# Patient Record
(demographics unavailable — no encounter records)

---

## 2024-11-25 NOTE — END OF VISIT
[FreeTextEntry3] :     Saw and examined patient; the above is an accurate documentation of my words and actions.   Nany Patricio MD [facility] Pediatric Orthopedic Surgery    [Time Spent: ___ minutes] : I have spent [unfilled] minutes of time on the encounter which excludes teaching and separately reported services.

## 2025-04-09 NOTE — HISTORY OF PRESENT ILLNESS
[FreeTextEntry1] : ALEXANDER is a 13 year old girl with Pertinent PMH:  RESPIRATORY HISTORY - Symptoms when sick: - Symptoms when well: - Symptoms with activity: - Albuterol or ICS use: - ER visits - Admissions: - Oral steroid use:  - FMH of Asthma: - Eczema: - Allergies (food/environment): - Frequent AOM: - Snore frequently and loud: - Vaccinations: up-to-date. - Covid history & vaccination status: - Additional history (pets - smoke exposure):  ___________________________________________________________________________________ Asthma Control Test - Asthma symptoms in the last 4 weeks:  1. Rate your asthma today?                          3-very good, 2-good, 1-bad, 0-very bad.     Score: 2. Activity induced symptoms? 3-very good, 2-sometimes, 1-frequently, 0-all the time.    Score: 3. How is cough?      3-none of the time, 2-sometimes, 1 -most time, 0-all the time.           Score: 4. Nighttime awakening?          3-none, 2-sometimes, 1-most time, 0-all the time.               Score: 5. Symptoms presented 5) none, 4) 1 - 3 times, 3) 4 - 10 times, 2) 11 - 18 time, 1) 19 - 24 times, 0) everyday. ---Daytime stx?   Score: ---Wheezing?      Score: ---Wake up?        Score:  Total score: **  If </or 19, asthma may not be controlled as well as it could be.

## 2025-06-13 NOTE — REASON FOR VISIT
[Initial Evaluation] : an initial evaluation of [Asthma/RAD] : asthma/RAD [Mother] : mother [Other: _____] : [unfilled] [Shortness of Breath] : shortness of breath [Patient] : patient [Father] : father

## 2025-06-13 NOTE — PHYSICAL EXAM
[Well Nourished] : well nourished [Well Developed] : well developed [Alert] : ~L alert [Active] : active [Normal Breathing Pattern] : normal breathing pattern [No Respiratory Distress] : no respiratory distress [No Allergic Shiners] : no allergic shiners [No Drainage] : no drainage [No Conjunctivitis] : no conjunctivitis [Tympanic Membranes Clear] : tympanic membranes were clear [Nasal Mucosa Non-Edematous] : nasal mucosa non-edematous [No Nasal Drainage] : no nasal drainage [No Polyps] : no polyps [No Sinus Tenderness] : no sinus tenderness [No Oral Pallor] : no oral pallor [No Oral Cyanosis] : no oral cyanosis [Non-Erythematous] : non-erythematous [No Exudates] : no exudates [No Postnasal Drip] : no postnasal drip [No Tonsillar Enlargement] : no tonsillar enlargement [Absence Of Retractions] : absence of retractions [Symmetric] : symmetric [Good Expansion] : good expansion [No Acc Muscle Use] : no accessory muscle use [Good aeration to bases] : good aeration to bases [Equal Breath Sounds] : equal breath sounds bilaterally [No Crackles] : no crackles [No Rhonchi] : no rhonchi [No Wheezing] : no wheezing [Normal Sinus Rhythm] : normal sinus rhythm [No Heart Murmur] : no heart murmur [Soft, Non-Tender] : soft, non-tender [No Hepatosplenomegaly] : no hepatosplenomegaly [Non Distended] : was not ~L distended [Abdomen Mass (___ Cm)] : no abdominal mass palpated [Full ROM] : full range of motion [No Clubbing] : no clubbing [Capillary Refill < 2 secs] : capillary refill less than two seconds [No Cyanosis] : no cyanosis [No Petechiae] : no petechiae [No Kyphoscoliosis] : no kyphoscoliosis [No Contractures] : no contractures [Alert and  Oriented] : alert and oriented [Normal Muscle Tone And Reflexes] : normal muscle tone and reflexes [No Abnormal Focal Findings] : no abnormal focal findings [No Birth Marks] : no birth marks [No Rashes] : no rashes [No Skin Lesions] : no skin lesions [FreeTextEntry4] : +rhinorrhea [FreeTextEntry7] : +mild reduction of breath sounds throughout

## 2025-06-13 NOTE — REVIEW OF SYSTEMS
[NI] : Genitourinary  [Nl] : Endocrine [Rhinorrhea] : rhinorrhea [Nasal Congestion] : nasal congestion [Cough] : cough [Shortness of Breath] : shortness of breath

## 2025-06-13 NOTE — DATA REVIEWED
[FreeTextEntry1] : I personally reviewed records, documentation and images (findings included into my note), by and including: - Keyonna Pineda MD 06/25/2024. - Dr. Nany Patricio, from 11/25/2024.

## 2025-06-13 NOTE — HISTORY OF PRESENT ILLNESS
[FreeTextEntry1] : ALEXANDER is a 14 year old girl with recurring exertional shortness of breath. FH of asthma (Father). ALLERGIES: spring/seasonal allergies. No PRNs meds. CARDS: seen for migraines.  INITIAL VISIT 6/11/2025 Symptoms started about a year ago, with SOB (feels like she can't breathe and chest tightness). Symptoms usually start mid-way through basketball/soccer and resolve 5-30 mins after rest, occurs most of time. RSV 4yo required [albuterol?] nebulization, things improved after and never nebulized again. Currently with some "allergy symptoms" with runny nose. Never tested. Spoke with mother via phone for a short time; discussed suspected asthma diagnosis. SpO2 today was 97% likely due to nail polish.  RESPIRATORY HISTORY - Symptoms when sick: +sometimes lingering cough. - Symptoms when well: none. - Symptoms with activity: +SOB - Albuterol or ICS use: +nebulized at 4yo. - ER visits - Admissions: no. - Oral steroid use: possibly at 4yo with RSV. - FMH of Asthma: +Father had 'sports induced asthma' but outgrew. He also has eczema. - Eczema: no. - Allergies (food/environment): no. - Frequent AOM: no. - Snore frequently and loud: no. - Vaccinations: up-to-date. - Additional history (pets - smoke exposure): "jean doddle", no triggered allergy symptoms.  ____________________________________________________________________________________ Asthma Control Test (ACT) >12 year olds. In the last 4 weeks: -Shortness of breath: 5. none, 4. once to twice/week, 3. three to six/week, 2. once/day, 1. >once/day. Score: 2 -Nighttime stx: 5. none, 4. once to twice/MONTH, 3. once/week, 2. two to three/week, 1. > 4x/week. Score: 5 -Rescue inhaler: 5. none, 4. once/week, 3. two to three/week, 2. once to twice/day, 1. > 3x/day. Score: 5 -Rate asthma control: 5. controlled, 4. well controlled, 3. somewhat, 2. poorly, 1. uncontrolled. Score: 1 -Activity limitations: 5. none, 4. A little, 3. Some, 2. Most, 1. All the time. Score: 3 Total score: 16  If </or 19, asthma may not be controlled as well as it could be.

## 2025-06-13 NOTE — ASSESSMENT
[FreeTextEntry1] : ALEXANDER, 14 year old girl with history of recurring respiratory symptoms (shortness of breath with activity), which along with physical examination (reduced lung air exchange) and presence of asthma risk factors (Father had asthma) support asthma, likely sports-induced although persistent asthma may be possible (mild obstructive pattern on PFT). Recommend implementing an asthma rescue inhaler to help prevent symptoms. Discussed, that in asthma acute asthma attacks may lead to complications including life-threatening events; this is likely unlikely in this patient. Discussed asthma etiology, risk factors, triggers (e.g. URIs, allergens, etc), complications and management. Educated on proper MDI/spacer technique, importance of medication compliance and encouraged tobacco smoke exposure avoidance.  Data reviewed and interpreted by me today, including pulmonary function test (PFT)/spirometry, revealed abnormalities, including mild obstructive pattern (based on step-down of flows primarily) which is consistent with more of a persistent asthma. Will start with albuterol for now. Plan to perform a pre- and post-bronchodilator spirometry at her next visit.   Seasonal allergies are often present in asthma. Patient has history of rhinorrhea which could support Allergic Rhinitis (AR). The use of medical treatment (e.g. antihistamines) and avoidance measure may be discussed in future. Also, discussed benefits of allergy testing - referred to allergist for SPT.   Discussed above assessment, management plan and potential medication side effects. Parent agreed with plan. All queries were answered. Evaluation includes normal saturation. Time excludes separately reported services.   Recommend: - RESCUE AS NEEDED INHALER: Albuterol, 2 - 4 puffs every 4 - 6 hours, "as needed" for cough, shortness of breath or wheeze (rescue inhaler, use with spacer). - Use Albuterol 15-20 minutes before physical activities. - Proper technique of inhaled medication administration reviewed. Adequate technique confirmed. - Allergy referral. Dr. Chucky Thompson, Dr. Ulises Rosas or Dr. Zhao Whittington. Call [phone] or [phone] to schedule appointment. - Follow-up in 3 months. - Pre- and post-bronchodilator spirometry PFT at next visit.

## 2025-06-13 NOTE — ASSESSMENT
[FreeTextEntry1] : ALEXANDER, 14 year old girl with history of recurring respiratory symptoms (shortness of breath with activity), which along with physical examination (reduced lung air exchange) and presence of asthma risk factors (Father had asthma) support asthma, likely sports-induced although persistent asthma may be possible (mild obstructive pattern on PFT). Recommend implementing an asthma rescue inhaler to help prevent symptoms. Discussed, that in asthma acute asthma attacks may lead to complications including life-threatening events; this is likely unlikely in this patient. Discussed asthma etiology, risk factors, triggers (e.g. URIs, allergens, etc), complications and management. Educated on proper MDI/spacer technique, importance of medication compliance and encouraged tobacco smoke exposure avoidance.  Data reviewed and interpreted by me today, including pulmonary function test (PFT)/spirometry, revealed abnormalities, including mild obstructive pattern (based on step-down of flows primarily) which is consistent with more of a persistent asthma. Will start with albuterol for now. Plan to perform a pre- and post-bronchodilator spirometry at her next visit.   Seasonal allergies are often present in asthma. Patient has history of rhinorrhea which could support Allergic Rhinitis (AR). The use of medical treatment (e.g. antihistamines) and avoidance measure may be discussed in future. Also, discussed benefits of allergy testing - referred to allergist for SPT.   Discussed above assessment, management plan and potential medication side effects. Parent agreed with plan. All queries were answered. Evaluation includes normal saturation. Time excludes separately reported services.   Recommend: - RESCUE AS NEEDED INHALER: Albuterol, 2 - 4 puffs every 4 - 6 hours, "as needed" for cough, shortness of breath or wheeze (rescue inhaler, use with spacer). - Use Albuterol 15-20 minutes before physical activities. - Proper technique of inhaled medication administration reviewed. Adequate technique confirmed. - Allergy referral. Dr. Chucky Thompson, Dr. Ulises Rosas or Dr. Zhao Whittington. Call [phone] or [phone] to schedule appointment. - Follow-up in 3 months. - Pre- and post-bronchodilator spirometry PFT at next visit.

## 2025-06-13 NOTE — DATA REVIEWED
[FreeTextEntry1] : I personally reviewed records, documentation and images (findings included into my note), by and including: - Keyonna Pineda MD 06/25/2024. - Dr. Nany Patricio, from 11/25/2024. 2021 20:35

## 2025-06-13 NOTE — CONSULT LETTER
[Dear  ___] : Dear  [unfilled], [Consult Letter:] : I had the pleasure of evaluating your patient, [unfilled]. [Please see my note below.] : Please see my note below. [Consult Closing:] : Thank you very much for allowing me to participate in the care of this patient.  If you have any questions, please do not hesitate to contact me. [Sincerely,] : Sincerely, [FreeTextEntry3] : Steve Lewis MD Attending Physician, Division of Pediatric Pulmonology.

## 2025-06-13 NOTE — IMPRESSION
[FreeTextEntry1] : 6/11/2025 simple pft/spirometry: mild obstructive pattern based on step-down of flows, borderline low FEV1/FVC. FEV1 was 97% which was normal.

## 2025-06-13 NOTE — PHYSICAL EXAM
[Well Nourished] : well nourished [Well Developed] : well developed [Alert] : ~L alert [Active] : active [Normal Breathing Pattern] : normal breathing pattern [No Respiratory Distress] : no respiratory distress [No Allergic Shiners] : no allergic shiners [No Drainage] : no drainage [No Conjunctivitis] : no conjunctivitis [Tympanic Membranes Clear] : tympanic membranes were clear [Nasal Mucosa Non-Edematous] : nasal mucosa non-edematous [No Nasal Drainage] : no nasal drainage [No Polyps] : no polyps [No Sinus Tenderness] : no sinus tenderness [No Oral Pallor] : no oral pallor [No Oral Cyanosis] : no oral cyanosis [Non-Erythematous] : non-erythematous [No Exudates] : no exudates [No Postnasal Drip] : no postnasal drip [No Tonsillar Enlargement] : no tonsillar enlargement [Absence Of Retractions] : absence of retractions [Symmetric] : symmetric [Good Expansion] : good expansion [No Acc Muscle Use] : no accessory muscle use [Good aeration to bases] : good aeration to bases [Equal Breath Sounds] : equal breath sounds bilaterally [No Crackles] : no crackles [No Rhonchi] : no rhonchi [No Wheezing] : no wheezing [Normal Sinus Rhythm] : normal sinus rhythm [No Heart Murmur] : no heart murmur [Soft, Non-Tender] : soft, non-tender [No Hepatosplenomegaly] : no hepatosplenomegaly [Non Distended] : was not ~L distended [Abdomen Mass (___ Cm)] : no abdominal mass palpated [Full ROM] : full range of motion [No Clubbing] : no clubbing [Capillary Refill < 2 secs] : capillary refill less than two seconds [No Cyanosis] : no cyanosis [No Petechiae] : no petechiae [No Kyphoscoliosis] : no kyphoscoliosis [No Contractures] : no contractures [Alert and  Oriented] : alert and oriented [No Abnormal Focal Findings] : no abnormal focal findings [Normal Muscle Tone And Reflexes] : normal muscle tone and reflexes [No Birth Marks] : no birth marks [No Rashes] : no rashes [No Skin Lesions] : no skin lesions [FreeTextEntry4] : +rhinorrhea [FreeTextEntry7] : +mild reduction of breath sounds throughout

## 2025-06-13 NOTE — ASSESSMENT
[FreeTextEntry1] : ALEXANDER, 14 year old girl with history of recurring respiratory symptoms (shortness of breath with activity), which along with physical examination (reduced lung air exchange) and presence of asthma risk factors (Father had asthma) support asthma, likely sports-induced although persistent asthma may be possible (mild obstructive pattern on PFT). Recommend implementing an asthma rescue inhaler to help prevent symptoms. Discussed, that in asthma acute asthma attacks may lead to complications including life-threatening events; this is likely unlikely in this patient. Discussed asthma etiology, risk factors, triggers (e.g. URIs, allergens, etc), complications and management. Educated on proper MDI/spacer technique, importance of medication compliance and encouraged tobacco smoke exposure avoidance.  Data reviewed and interpreted by me today, including pulmonary function test (PFT)/spirometry, revealed abnormalities, including mild obstructive pattern (based on step-down of flows primarily) which is consistent with more of a persistent asthma. Will start with albuterol for now. Plan to perform a pre- and post-bronchodilator spirometry at her next visit.   Seasonal allergies are often present in asthma. Patient has history of rhinorrhea which could support Allergic Rhinitis (AR). The use of medical treatment (e.g. antihistamines) and avoidance measure may be discussed in future. Also, discussed benefits of allergy testing - referred to allergist for SPT.   Discussed above assessment, management plan and potential medication side effects. Parent agreed with plan. All queries were answered. Evaluation includes normal saturation. Time excludes separately reported services.   Recommend: - RESCUE AS NEEDED INHALER: Albuterol, 2 - 4 puffs every 4 - 6 hours, "as needed" for cough, shortness of breath or wheeze (rescue inhaler, use with spacer). - Use Albuterol 15-20 minutes before physical activities. - Proper technique of inhaled medication administration reviewed. Adequate technique confirmed. - Allergy referral. Dr. Chucky Thompson, Dr. Ulises Rosas or Dr. Zhao Whittington. Call [phone] or [phone] to schedule appointment. - Follow-up in 3 months. - Pre- and post-bronchodilator spirometry PFT at next visit.

## 2025-06-13 NOTE — HISTORY OF PRESENT ILLNESS
[FreeTextEntry1] : ALEXANDER is a 14 year old girl with recurring exertional shortness of breath. FH of asthma (Father). ALLERGIES: spring/seasonal allergies. No PRNs meds. CARDS: seen for migraines.  INITIAL VISIT 6/11/2025 Symptoms started about a year ago, with SOB (feels like she can't breathe and chest tightness). Symptoms usually start mid-way through basketball/soccer and resolve 5-30 mins after rest, occurs most of time. RSV 6yo required [albuterol?] nebulization, things improved after and never nebulized again. Currently with some "allergy symptoms" with runny nose. Never tested. Spoke with mother via phone for a short time; discussed suspected asthma diagnosis. SpO2 today was 97% likely due to nail polish.  RESPIRATORY HISTORY - Symptoms when sick: +sometimes lingering cough. - Symptoms when well: none. - Symptoms with activity: +SOB - Albuterol or ICS use: +nebulized at 6yo. - ER visits - Admissions: no. - Oral steroid use: possibly at 6yo with RSV. - FMH of Asthma: +Father had 'sports induced asthma' but outgrew. He also has eczema. - Eczema: no. - Allergies (food/environment): no. - Frequent AOM: no. - Snore frequently and loud: no. - Vaccinations: up-to-date. - Additional history (pets - smoke exposure): "jean doddle", no triggered allergy symptoms.  ____________________________________________________________________________________ Asthma Control Test (ACT) >12 year olds. In the last 4 weeks: -Shortness of breath: 5. none, 4. once to twice/week, 3. three to six/week, 2. once/day, 1. >once/day. Score: 2 -Nighttime stx: 5. none, 4. once to twice/MONTH, 3. once/week, 2. two to three/week, 1. > 4x/week. Score: 5 -Rescue inhaler: 5. none, 4. once/week, 3. two to three/week, 2. once to twice/day, 1. > 3x/day. Score: 5 -Rate asthma control: 5. controlled, 4. well controlled, 3. somewhat, 2. poorly, 1. uncontrolled. Score: 1 -Activity limitations: 5. none, 4. A little, 3. Some, 2. Most, 1. All the time. Score: 3 Total score: 16  If </or 19, asthma may not be controlled as well as it could be.